# Patient Record
Sex: MALE | URBAN - METROPOLITAN AREA
[De-identification: names, ages, dates, MRNs, and addresses within clinical notes are randomized per-mention and may not be internally consistent; named-entity substitution may affect disease eponyms.]

---

## 2023-08-25 ENCOUNTER — WEB ENCOUNTER (OUTPATIENT)
Dept: URBAN - METROPOLITAN AREA CLINIC 54 | Facility: CLINIC | Age: 33
End: 2023-08-25

## 2023-08-25 ENCOUNTER — OFFICE VISIT (OUTPATIENT)
Dept: URBAN - METROPOLITAN AREA CLINIC 54 | Facility: CLINIC | Age: 33
End: 2023-08-25
Payer: COMMERCIAL

## 2023-08-25 ENCOUNTER — LAB OUTSIDE AN ENCOUNTER (OUTPATIENT)
Dept: URBAN - METROPOLITAN AREA CLINIC 54 | Facility: CLINIC | Age: 33
End: 2023-08-25

## 2023-08-25 VITALS
BODY MASS INDEX: 28.72 KG/M2 | HEART RATE: 66 BPM | SYSTOLIC BLOOD PRESSURE: 123 MMHG | TEMPERATURE: 97.9 F | DIASTOLIC BLOOD PRESSURE: 98 MMHG | WEIGHT: 200.6 LBS | HEIGHT: 70 IN

## 2023-08-25 DIAGNOSIS — Z86.010 PERSONAL HISTORY OF COLONIC POLYPS: ICD-10-CM

## 2023-08-25 DIAGNOSIS — K62.5 RECTAL BLEEDING: ICD-10-CM

## 2023-08-25 DIAGNOSIS — K59.01 SLOW TRANSIT CONSTIPATION: ICD-10-CM

## 2023-08-25 PROBLEM — 428283002: Status: ACTIVE | Noted: 2023-08-25

## 2023-08-25 PROBLEM — 35298007: Status: ACTIVE | Noted: 2023-08-25

## 2023-08-25 PROCEDURE — 99204 OFFICE O/P NEW MOD 45 MIN: CPT | Performed by: PHYSICIAN ASSISTANT

## 2023-08-25 RX ORDER — DOCUSATE SODIUM 100 MG/1
1 CAPSULE AS NEEDED CAPSULE ORAL ONCE A DAY
Status: ACTIVE | COMMUNITY

## 2023-08-25 RX ORDER — SODIUM PICOSULFATE, MAGNESIUM OXIDE, AND ANHYDROUS CITRIC ACID 12; 3.5; 1 G/175ML; G/175ML; MG/175ML
175 ML THE FIRST DOSE THE EVENING BEFORE AND SECOND DOSE THE MORNING OF COLONOSCOPY LIQUID ORAL ONCE A DAY
Qty: 350 | OUTPATIENT
Start: 2023-08-25 | End: 2023-08-27

## 2023-08-25 NOTE — HPI-TODAY'S VISIT:
Juan Jose Burns is a 33 year old male pt with PMH of longstanding constipation who presents to clinic as a referral from PCP.  Patient has history of chronic constipation and started on Colace by PCP. No significant difference noted in the last 1.5 weeks of taking colace. Patient also experiencing bright red rectal bleeding that began recently and lasted a few days. Noted blood initally everywhere and next BM more surrounding stool.  He has a personal history of colon polyps from last colonoscopy 3 years ago, but unable to see report and biopsies at this time.  He does have a family history of colon cancer. No weight loss. No history of AMIE. Denies tobacco and alcohol use. Occasional NSAID use for headaches.

## 2023-09-15 ENCOUNTER — OUT OF OFFICE VISIT (OUTPATIENT)
Dept: URBAN - METROPOLITAN AREA SURGERY CENTER 14 | Facility: SURGERY CENTER | Age: 33
End: 2023-09-15
Payer: COMMERCIAL

## 2023-09-15 DIAGNOSIS — K64.0 GRADE I INTERNAL HEMORRHOIDS: ICD-10-CM

## 2023-09-15 DIAGNOSIS — K92.1 ACUTE MELENA: ICD-10-CM

## 2023-09-15 DIAGNOSIS — K92.1 HEMATOCHEZIA: ICD-10-CM

## 2023-09-15 PROCEDURE — G8907 PT DOC NO EVENTS ON DISCHARG: HCPCS | Performed by: STUDENT IN AN ORGANIZED HEALTH CARE EDUCATION/TRAINING PROGRAM

## 2023-09-15 PROCEDURE — 45378 DIAGNOSTIC COLONOSCOPY: CPT | Performed by: STUDENT IN AN ORGANIZED HEALTH CARE EDUCATION/TRAINING PROGRAM

## 2023-09-15 PROCEDURE — 00811 ANES LWR INTST NDSC NOS: CPT | Performed by: NURSE ANESTHETIST, CERTIFIED REGISTERED

## 2023-09-15 RX ORDER — DOCUSATE SODIUM 100 MG/1
1 CAPSULE AS NEEDED CAPSULE ORAL ONCE A DAY
Status: ACTIVE | COMMUNITY

## 2023-10-05 PROBLEM — 14760008: Status: ACTIVE | Noted: 2023-10-05

## 2023-10-05 PROBLEM — 721703004: Status: ACTIVE | Noted: 2023-10-05

## 2023-10-06 ENCOUNTER — DASHBOARD ENCOUNTERS (OUTPATIENT)
Age: 33
End: 2023-10-06

## 2023-10-06 ENCOUNTER — OFFICE VISIT (OUTPATIENT)
Dept: URBAN - METROPOLITAN AREA CLINIC 54 | Facility: CLINIC | Age: 33
End: 2023-10-06
Payer: COMMERCIAL

## 2023-10-06 VITALS
HEART RATE: 65 BPM | DIASTOLIC BLOOD PRESSURE: 93 MMHG | WEIGHT: 194.8 LBS | SYSTOLIC BLOOD PRESSURE: 124 MMHG | BODY MASS INDEX: 27.89 KG/M2 | HEIGHT: 70 IN | TEMPERATURE: 97.7 F

## 2023-10-06 DIAGNOSIS — K59.09 CHANGE IN BOWEL MOVEMENTS INTERMITTENT CONSTIPATION. URGENCY IN THE MORNING.: ICD-10-CM

## 2023-10-06 DIAGNOSIS — K64.0 GRADE I INTERNAL HEMORRHOIDS: ICD-10-CM

## 2023-10-06 DIAGNOSIS — R14.0 BLOATING: ICD-10-CM

## 2023-10-06 PROCEDURE — 91065 BREATH HYDROGEN/METHANE TEST: CPT | Performed by: PHYSICIAN ASSISTANT

## 2023-10-06 PROCEDURE — 99213 OFFICE O/P EST LOW 20 MIN: CPT | Performed by: PHYSICIAN ASSISTANT

## 2023-10-06 NOTE — HPI-TODAY'S VISIT:
Juan Jose Burns is a 33 year old male pt with PMH of longstanding constipation who presents to clinic as a referral from PCP.  Patient has history of chronic constipation and started on Colace by PCP. No significant difference noted in the last 1.5 weeks of taking colace. Patient also experiencing bright red rectal bleeding that began recently and lasted a few days. Noted blood initally everywhere and next BM more surrounding stool.  He has a personal history of colon polyps from last colonoscopy 3 years ago, but unable to see report and biopsies at this time.  He does have a family history of colon cancer. No weight loss. No history of AMIE. Denies tobacco and alcohol use. Occasional NSAID use for headaches.  10/6/23: Patient here for follow-up.  Colonoscopy performed on 9/15/2023 for hematochezia with Dr. Parekh that showed internal hemorrhoids and otherwise normal exam.  Repeat colonoscopy in 5 years for surveillance. Patient reports bloating after certain foods and mostly with pizza and bread.  He is interested in being tested for celiac. Takes daily fiber supplement but is unclear of dose.

## 2023-10-15 LAB
DEAMIDATED GLIADIN ABS, IGA: 11
H PYLORI BREATH TEST: NEGATIVE
IMMUNOGLOBULIN A, QN, SERUM: 402
T-TRANSGLUTAMINASE (TTG) IGA: <2